# Patient Record
Sex: FEMALE | Race: OTHER | NOT HISPANIC OR LATINO | URBAN - METROPOLITAN AREA
[De-identification: names, ages, dates, MRNs, and addresses within clinical notes are randomized per-mention and may not be internally consistent; named-entity substitution may affect disease eponyms.]

---

## 2023-07-08 ENCOUNTER — EMERGENCY (EMERGENCY)
Facility: HOSPITAL | Age: 48
LOS: 1 days | Discharge: LEFT BEFORE DISCHARGE | End: 2023-07-08
Admitting: EMERGENCY MEDICINE
Payer: COMMERCIAL

## 2023-07-08 VITALS
HEART RATE: 70 BPM | TEMPERATURE: 98 F | OXYGEN SATURATION: 98 % | SYSTOLIC BLOOD PRESSURE: 159 MMHG | RESPIRATION RATE: 16 BRPM | DIASTOLIC BLOOD PRESSURE: 94 MMHG

## 2023-07-08 PROCEDURE — 99283 EMERGENCY DEPT VISIT LOW MDM: CPT

## 2023-07-08 NOTE — ED PROVIDER NOTE - PROGRESS NOTE DETAILS
Placed and eye shield on the PT L eye. called BI for ophthalmology consult and informed pt will be consulting ophtho to further help in evaluation but pt left the emergency room w/o informing provider or RN This was a difficult patient encounter. The patient's expectations as to management were not able to be met given the guidelines from the state, hospital guidelines, and the patient's personal care guidelines. I discussed with the patient my concerns and offered treatment options that were in keeping with our policies and procedures. Unfortunately this did not alleviate the patient's concerns. At all times, the staff and I were respectful of the patient, attempted to solve the issues within our constraints, and treated the patient with all due courtesy. Unfortunately, the patient left disappointed as to their expectations. The patient received medically appropriate treatment for the objective findings. I encouraged them to follow up with a primary care provider for further treatment and to return to the ED immediately if they had any new or worsening symptoms or concerns.

## 2023-07-08 NOTE — ED ADULT TRIAGE NOTE - CHIEF COMPLAINT QUOTE
Pt walk in c/o left eye pain. Pt states she had "semi" permanent contact lens procedure x2 days ago, pt states "I think its dried out but it hurts and I can't take it out". Swelling and redness noted to left eye. Pt denies visual changes or headache.

## 2023-07-08 NOTE — ED PROVIDER NOTE - CLINICAL SUMMARY MEDICAL DECISION MAKING FREE TEXT BOX
46 yo F pw acute onset of eye pain aching severe and burning for few hours in duration s/p PRK procedure 2 d ago, in the ed protective contact lens fell out of the L eye, now pt appears to be in pain and uncomfortable. Normal eye pressures

## 2023-07-08 NOTE — ED PROVIDER NOTE - PHYSICAL EXAMINATION
Physical Exam    Vital Signs: I have reviewed the initial vital signs.  Constitutional: well-appearing, appears stated age  Eyes: PERRLA, EOM intact, RAPD absent, no conjunctival injection, and symmetrical lids. OS, OU, OD 20/50, pressure 10 mmHg OS and OD  ENT: Neck supple with no adenopathy, moist MM.

## 2023-07-08 NOTE — ED ADULT NURSE NOTE - NSFALLUNIVINTERV_ED_ALL_ED
Bed/Stretcher in lowest position, wheels locked, appropriate side rails in place/Call bell, personal items and telephone in reach/Instruct patient to call for assistance before getting out of bed/chair/stretcher/Non-slip footwear applied when patient is off stretcher/[place] to call system/Physically safe environment - no spills, clutter or unnecessary equipment/Purposeful proactive rounding/Room/bathroom lighting operational, light cord in reach

## 2023-07-08 NOTE — ED PROVIDER NOTE - OBJECTIVE STATEMENT
46 yo F pw acute onset of eye pain aching severe and burning for few hours in duration s/p PRK procedure 2 d ago, in the ed protective contact lens fell out of the L eye, now pt appears to be in pain and uncomfortable 48 yo F pw acute onset of eye pain aching severe and burning for few hours in duration s/p PRK procedure 2 d ago, in the ed protective contact lens fell out of the L eye, now pt appears to be in pain and uncomfortable.     I have reviewed available current nursing and previous documentation of past medical, surgical, family, and/or social history.

## 2023-07-11 DIAGNOSIS — Z98.890 OTHER SPECIFIED POSTPROCEDURAL STATES: ICD-10-CM

## 2023-07-11 DIAGNOSIS — H57.12 OCULAR PAIN, LEFT EYE: ICD-10-CM

## 2023-07-11 DIAGNOSIS — Z53.29 PROCEDURE AND TREATMENT NOT CARRIED OUT BECAUSE OF PATIENT'S DECISION FOR OTHER REASONS: ICD-10-CM
